# Patient Record
Sex: FEMALE | Race: WHITE | Employment: UNEMPLOYED | URBAN - METROPOLITAN AREA
[De-identification: names, ages, dates, MRNs, and addresses within clinical notes are randomized per-mention and may not be internally consistent; named-entity substitution may affect disease eponyms.]

---

## 2021-10-22 ENCOUNTER — HOSPITAL ENCOUNTER (EMERGENCY)
Age: 41
Discharge: HOME | End: 2021-10-22
Attending: EMERGENCY MEDICINE
Payer: MEDICAID

## 2021-10-22 ENCOUNTER — APPOINTMENT (OUTPATIENT)
Dept: GENERAL RADIOLOGY | Age: 41
End: 2021-10-22
Attending: PHYSICIAN ASSISTANT
Payer: MEDICAID

## 2021-10-22 VITALS
BODY MASS INDEX: 47.09 KG/M2 | OXYGEN SATURATION: 99 % | SYSTOLIC BLOOD PRESSURE: 164 MMHG | WEIGHT: 293 LBS | RESPIRATION RATE: 18 BRPM | HEIGHT: 66 IN | TEMPERATURE: 98 F | DIASTOLIC BLOOD PRESSURE: 82 MMHG | HEART RATE: 91 BPM

## 2021-10-22 DIAGNOSIS — S90.454A: Primary | ICD-10-CM

## 2021-10-22 DIAGNOSIS — L03.115 CELLULITIS OF RIGHT LOWER EXTREMITY: ICD-10-CM

## 2021-10-22 PROCEDURE — 90471 IMMUNIZATION ADMIN: CPT

## 2021-10-22 PROCEDURE — 87205 SMEAR GRAM STAIN: CPT | Performed by: PHYSICIAN ASSISTANT

## 2021-10-22 PROCEDURE — 87070 CULTURE OTHR SPECIMN AEROBIC: CPT | Performed by: PHYSICIAN ASSISTANT

## 2021-10-22 PROCEDURE — 99284 EMERGENCY DEPT VISIT MOD MDM: CPT

## 2021-10-22 PROCEDURE — 73660 X-RAY EXAM OF TOE(S): CPT | Performed by: PHYSICIAN ASSISTANT

## 2021-10-22 PROCEDURE — 99283 EMERGENCY DEPT VISIT LOW MDM: CPT

## 2021-10-22 RX ORDER — LOSARTAN POTASSIUM 25 MG/1
TABLET ORAL DAILY
COMMUNITY

## 2021-10-22 RX ORDER — TOPIRAMATE 50 MG/1
50 TABLET, FILM COATED ORAL 2 TIMES DAILY
COMMUNITY

## 2021-10-22 RX ORDER — SULFAMETHOXAZOLE AND TRIMETHOPRIM 800; 160 MG/1; MG/1
1 TABLET ORAL 2 TIMES DAILY
Qty: 14 TABLET | Refills: 0 | Status: SHIPPED | OUTPATIENT
Start: 2021-10-22 | End: 2021-10-29

## 2021-10-22 RX ORDER — HYDROCHLOROTHIAZIDE 25 MG/1
25 TABLET ORAL DAILY
COMMUNITY

## 2021-10-22 NOTE — ED INITIAL ASSESSMENT (HPI)
Pt stepped on a colored pencil went through her toe r foot 2nd digit. Pt has redness and went to a wic started on antibiotics no better.

## 2021-10-22 NOTE — ED PROVIDER NOTES
Patient Seen in: [facility] In [place]      History   Patient presents with:  Cellulitis    Stated Complaint: cellulitis to toe    Subjective:   HPI    30-year-old female who is a known diabetic comes in today after 5 days ago she manpreet wheezing or rales. Musculoskeletal:      Cervical back: Normal range of motion. Right foot: Swelling, laceration and tenderness present.       Comments: Wound between the first and second toe with purulent discharge upon expression no obvious foreign [facility] [place] [id number]  [phone]    Schedule an appointment as soon as possible for a visit            Medications Prescribed:  Discharge Medication List as of 10/22/2021  4:26 PM    START taking these medications    sulfamethoxazole-trimethoprim DS 80

## 2021-10-22 NOTE — ED PROVIDER NOTES
I reviewed that chart and discussed the case. I have examined the patient and noted persistent drainage from a small puncture wound to the right second toe status post eval at urgent care and given Keflex. Wound cultured and will add Bactrim.   No foreign